# Patient Record
Sex: FEMALE | Employment: OTHER | URBAN - METROPOLITAN AREA
[De-identification: names, ages, dates, MRNs, and addresses within clinical notes are randomized per-mention and may not be internally consistent; named-entity substitution may affect disease eponyms.]

---

## 2023-01-04 ENCOUNTER — ANESTHESIA EVENT (OUTPATIENT)
Dept: SURGERY | Facility: CLINIC | Age: 78
End: 2023-01-04
Payer: COMMERCIAL

## 2023-01-04 ASSESSMENT — COPD QUESTIONNAIRES
COPD: 1
CAT_SEVERITY: MODERATE

## 2023-01-04 ASSESSMENT — LIFESTYLE VARIABLES: TOBACCO_USE: 1

## 2023-01-05 ENCOUNTER — HOSPITAL ENCOUNTER (OUTPATIENT)
Facility: CLINIC | Age: 78
Discharge: HOME | End: 2023-01-05
Attending: STUDENT IN AN ORGANIZED HEALTH CARE EDUCATION/TRAINING PROGRAM | Admitting: STUDENT IN AN ORGANIZED HEALTH CARE EDUCATION/TRAINING PROGRAM
Payer: COMMERCIAL

## 2023-01-05 ENCOUNTER — ANESTHESIA (OUTPATIENT)
Dept: SURGERY | Facility: CLINIC | Age: 78
End: 2023-01-05
Payer: COMMERCIAL

## 2023-01-05 VITALS
TEMPERATURE: 98 F | OXYGEN SATURATION: 90 % | SYSTOLIC BLOOD PRESSURE: 119 MMHG | HEART RATE: 62 BPM | DIASTOLIC BLOOD PRESSURE: 65 MMHG | RESPIRATION RATE: 16 BRPM

## 2023-01-05 PROCEDURE — 250N000009 HC RX 250: Performed by: NURSE ANESTHETIST, CERTIFIED REGISTERED

## 2023-01-05 PROCEDURE — 761N000008 HC RECOVERY CATRACT PACKAGE: Performed by: STUDENT IN AN ORGANIZED HEALTH CARE EDUCATION/TRAINING PROGRAM

## 2023-01-05 PROCEDURE — 250N000011 HC RX IP 250 OP 636: Performed by: STUDENT IN AN ORGANIZED HEALTH CARE EDUCATION/TRAINING PROGRAM

## 2023-01-05 PROCEDURE — 360N000007 HC CATARACT SURGICAL PACKAGE: Performed by: STUDENT IN AN ORGANIZED HEALTH CARE EDUCATION/TRAINING PROGRAM

## 2023-01-05 PROCEDURE — V2632 POST CHMBR INTRAOCULAR LENS: HCPCS | Performed by: STUDENT IN AN ORGANIZED HEALTH CARE EDUCATION/TRAINING PROGRAM

## 2023-01-05 PROCEDURE — 250N000011 HC RX IP 250 OP 636: Performed by: NURSE ANESTHETIST, CERTIFIED REGISTERED

## 2023-01-05 PROCEDURE — 370N000004 HC ANESTHESIA CATARACT PACKAGE: Performed by: STUDENT IN AN ORGANIZED HEALTH CARE EDUCATION/TRAINING PROGRAM

## 2023-01-05 PROCEDURE — 250N000009 HC RX 250: Performed by: STUDENT IN AN ORGANIZED HEALTH CARE EDUCATION/TRAINING PROGRAM

## 2023-01-05 DEVICE — EYE IMP IOL AMO PCL TECNIS ZCB00 22.5: Type: IMPLANTABLE DEVICE | Site: EYE | Status: FUNCTIONAL

## 2023-01-05 RX ORDER — LIDOCAINE 40 MG/G
CREAM TOPICAL
Status: DISCONTINUED | OUTPATIENT
Start: 2023-01-05 | End: 2023-01-05 | Stop reason: HOSPADM

## 2023-01-05 RX ORDER — DICLOFENAC SODIUM 1 MG/ML
1 SOLUTION/ DROPS OPHTHALMIC
Status: COMPLETED | OUTPATIENT
Start: 2023-01-05 | End: 2023-01-05

## 2023-01-05 RX ORDER — MOXIFLOXACIN 5 MG/ML
1 SOLUTION/ DROPS OPHTHALMIC
Status: COMPLETED | OUTPATIENT
Start: 2023-01-05 | End: 2023-01-05

## 2023-01-05 RX ORDER — PREDNISOLONE ACETATE 1 %
SUSPENSION, DROPS(FINAL DOSAGE FORM)(ML) OPHTHALMIC (EYE) PRN
Status: DISCONTINUED | OUTPATIENT
Start: 2023-01-05 | End: 2023-01-05 | Stop reason: HOSPADM

## 2023-01-05 RX ORDER — FENTANYL CITRATE 50 UG/ML
INJECTION, SOLUTION INTRAMUSCULAR; INTRAVENOUS PRN
Status: DISCONTINUED | OUTPATIENT
Start: 2023-01-05 | End: 2023-01-05

## 2023-01-05 RX ORDER — PROPARACAINE HYDROCHLORIDE 5 MG/ML
1 SOLUTION/ DROPS OPHTHALMIC ONCE
Status: DISCONTINUED | OUTPATIENT
Start: 2023-01-05 | End: 2023-01-05 | Stop reason: HOSPADM

## 2023-01-05 RX ORDER — PHENYLEPHRINE HYDROCHLORIDE 25 MG/ML
1 SOLUTION/ DROPS OPHTHALMIC
Status: COMPLETED | OUTPATIENT
Start: 2023-01-05 | End: 2023-01-05

## 2023-01-05 RX ORDER — PROPARACAINE HYDROCHLORIDE 5 MG/ML
1 SOLUTION/ DROPS OPHTHALMIC ONCE
Status: COMPLETED | OUTPATIENT
Start: 2023-01-05 | End: 2023-01-05

## 2023-01-05 RX ORDER — CYCLOPENTOLATE HYDROCHLORIDE 10 MG/ML
1 SOLUTION/ DROPS OPHTHALMIC
Status: COMPLETED | OUTPATIENT
Start: 2023-01-05 | End: 2023-01-05

## 2023-01-05 RX ADMIN — PROPARACAINE HYDROCHLORIDE 1 DROP: 5 SOLUTION/ DROPS OPHTHALMIC at 11:09

## 2023-01-05 RX ADMIN — MIDAZOLAM 1 MG: 1 INJECTION INTRAMUSCULAR; INTRAVENOUS at 11:51

## 2023-01-05 RX ADMIN — PHENYLEPHRINE HYDROCHLORIDE 1 DROP: 25 SOLUTION/ DROPS OPHTHALMIC at 11:23

## 2023-01-05 RX ADMIN — PHENYLEPHRINE HYDROCHLORIDE 1 DROP: 25 SOLUTION/ DROPS OPHTHALMIC at 11:14

## 2023-01-05 RX ADMIN — DICLOFENAC SODIUM 1 DROP: 1 SOLUTION OPHTHALMIC at 11:21

## 2023-01-05 RX ADMIN — CYCLOPENTOLATE HYDROCHLORIDE 1 DROP: 10 SOLUTION/ DROPS OPHTHALMIC at 11:18

## 2023-01-05 RX ADMIN — DICLOFENAC SODIUM 1 DROP: 1 SOLUTION OPHTHALMIC at 11:11

## 2023-01-05 RX ADMIN — CYCLOPENTOLATE HYDROCHLORIDE 1 DROP: 10 SOLUTION/ DROPS OPHTHALMIC at 11:12

## 2023-01-05 RX ADMIN — CYCLOPENTOLATE HYDROCHLORIDE 1 DROP: 10 SOLUTION/ DROPS OPHTHALMIC at 11:22

## 2023-01-05 RX ADMIN — MOXIFLOXACIN OPHTHALMIC SOLUTION 1 DROP: 5 SOLUTION/ DROPS OPHTHALMIC at 11:23

## 2023-01-05 RX ADMIN — MOXIFLOXACIN OPHTHALMIC SOLUTION 1 DROP: 5 SOLUTION/ DROPS OPHTHALMIC at 11:19

## 2023-01-05 RX ADMIN — FENTANYL CITRATE 50 MCG: 50 INJECTION, SOLUTION INTRAMUSCULAR; INTRAVENOUS at 11:47

## 2023-01-05 RX ADMIN — MIDAZOLAM 1 MG: 1 INJECTION INTRAMUSCULAR; INTRAVENOUS at 11:47

## 2023-01-05 RX ADMIN — DICLOFENAC SODIUM 1 DROP: 1 SOLUTION OPHTHALMIC at 11:16

## 2023-01-05 RX ADMIN — PHENYLEPHRINE HYDROCHLORIDE 1 DROP: 25 SOLUTION/ DROPS OPHTHALMIC at 11:20

## 2023-01-05 RX ADMIN — LIDOCAINE HYDROCHLORIDE 0.5 ML: 10 INJECTION, SOLUTION EPIDURAL; INFILTRATION; INTRACAUDAL; PERINEURAL at 11:12

## 2023-01-05 RX ADMIN — MOXIFLOXACIN OPHTHALMIC SOLUTION 1 DROP: 5 SOLUTION/ DROPS OPHTHALMIC at 11:13

## 2023-01-05 ASSESSMENT — ACTIVITIES OF DAILY LIVING (ADL)
ADLS_ACUITY_SCORE: 35
ADLS_ACUITY_SCORE: 33

## 2023-01-05 NOTE — ANESTHESIA PREPROCEDURE EVALUATION
Anesthesia Pre-Procedure Evaluation    Patient: Bianca Holden   MRN: [id number] : 1945        Procedure : Procedure(s):  PHACOEMULSIFICATION, CATARACT, WITH STANDARD INTRAOCULAR LENS IMPLANT INSERTION          Past Medical History:   Diagnosis Date     Abnormal Papanicolaou smear of cervix and cervical HPV     s/p LOOP excision, normal PAPs since     Other nonspecific findings on examination of blood(790.99) 01    FSH 50.0  Normal range 4-13 , Postmenopausal      Pleurisy without mention of effusion or current tuberculosis     History of pleurisy     Tobacco use disorder      Unspecified , without mention of complication, unspecified     at 6 weeks gestation, pregnancy #2 ()     Unspecified menopausal and postmenopausal disorder     michelle-menopausal      Past Surgical History:   Procedure Laterality Date     C MAMMOGRAM, SCREENING  ,     normal     COLPOSCOPY,LOOP ELECTRD CERVIX EXCIS  10/02/1996    Pap smear revealed high grade squamous intraepithelial lesion; colposcopy revealed diffuse changes consistent with GENE I-II     HCL PAP SMEAR  ,      [facility] LIGATE FALLOPIAN TUBE,POSTPARTUM      Tubal Ligation      Allergies   Allergen Reactions     Sulfa Drugs      Bactrim - pain in arms and legs      Social History     Tobacco Use     Smoking status: Every Day     Packs/day: 1.00     Years: 37.00     Pack years: 37.00     Types: Cigarettes     Smokeless tobacco: Not on file   Substance Use Topics     Alcohol use: Yes     Alcohol/week: 6.7 standard drinks     Comment: 4 beers on weekends if she goes out      Wt Readings from Last 1 Encounters:   02 60.8 kg (134 lb)        Anesthesia Evaluation   Pt has had prior anesthetic. Type: MAC and General.    No history of anesthetic complications       ROS/MED HX  ENT/Pulmonary: Comment: H/O pleurisy     (+) tobacco use, Current use, moderate,  COPD,     Neurologic:  - neg neurologic ROS     Cardiovascular:     (+)  Dyslipidemia hypertension-----No previous cardiac testing     METS/Exercise Tolerance:     Hematologic:  - neg hematologic  ROS     Musculoskeletal:       GI/Hepatic:     (+) GERD, Asymptomatic on medication,     Renal/Genitourinary:  - neg Renal ROS     Endo:  - neg endo ROS     Psychiatric/Substance Use: Comment: Stopped drinking in 2000    (+) psychiatric history anxiety and depression alcohol abuse     Infectious Disease:  - neg infectious disease ROS     Malignancy:  - neg malignancy ROS     Other:  - neg other ROS    (+) , H/O Chronic Pain,        Physical Exam    Airway  airway exam normal      Mallampati: II   TM distance: > 3 FB   Neck ROM: full   Mouth opening: > 3 cm    Respiratory Devices and Support         Dental       (+) caps      Cardiovascular   cardiovascular exam normal       Rhythm and rate: regular and normal     Pulmonary   pulmonary exam normal        breath sounds clear to auscultation           OUTSIDE LABS:  CBC:   Lab Results   Component Value Date    WBC 3.6 (L) 05/01/2001    HGB 15.3 05/01/2001    HCT 43.6 05/01/2001     05/01/2001     BMP:   Lab Results   Component Value Date     (H) 05/01/2001    POTASSIUM 4.5 05/01/2001    CHLORIDE 112 (H) 05/01/2001    CO2 28 05/01/2001    BUN 8 05/01/2001    CR 0.6 05/01/2001    GLC 85 05/01/2001     COAGS: No results found for: PTT, INR, FIBR  POC: No results found for: BGM, HCG, HCGS  HEPATIC:   Lab Results   Component Value Date    ALBUMIN 4.5 05/01/2001    PROTTOTAL 7.9 05/01/2001    ALT 93 (H) 05/01/2001     (H) 05/01/2001    ALKPHOS 118 05/01/2001    BILITOTAL 0.6 05/01/2001     OTHER:   Lab Results   Component Value Date    VITALIY 9.1 05/01/2001    TSH 1.38 05/01/2001    SED 23 05/01/2001       Anesthesia Plan    ASA Status:  3   NPO Status:  NPO Appropriate    Anesthesia Type: MAC.     - Reason for MAC: straight local not clinically adequate      Maintenance: TIVA.        Consents    Anesthesia Plan(s) and associated risks,  benefits, and realistic alternatives discussed. Questions answered and patient/representative(s) expressed understanding.    - Discussed:     - Discussed with:  Patient    Use of blood products discussed: No .     Postoperative Care            Comments:    Other Comments: The risks and benefits of anesthesia, and the alternatives where applicable, have been discussed with the patient, and they wish to proceed.       H&P reviewed: Unable to attach H&P to encounter due to EHR limitations. H&P Update: appropriate H&P reviewed, patient examined. No interval changes since H&P (within 30 days).         CAMPOS Ontiveros CRNA

## 2023-01-05 NOTE — ANESTHESIA CARE TRANSFER NOTE
Patient: Bianca Holden    Procedure: Procedure(s):  PHACOEMULSIFICATION, CATARACT, WITH STANDARD INTRAOCULAR LENS IMPLANT INSERTION right       Diagnosis: Cataract [H26.9]  Diagnosis Additional Information: No value filed.    Anesthesia Type:   MAC     Note:    Oropharynx: spontaneously breathing  Level of Consciousness: awake  Oxygen Supplementation: room air    Independent Airway: airway patency satisfactory and stable  Dentition: dentition unchanged  Vital Signs Stable: post-procedure vital signs reviewed and stable  Report to RN Given: handoff report given  Patient transferred to: Phase II    Handoff Report: Identifed the Patient, Identified the Reponsible Provider, Reviewed the pertinent medical history, Discussed the surgical course, Reviewed Intra-OP anesthesia mangement and issues during anesthesia, Set expectations for post-procedure period and Allowed opportunity for questions and acknowledgement of understanding      Vitals:  Vitals Value Taken Time   BP     Temp     Pulse     Resp     SpO2         Electronically Signed By: CAMPOS Ko CRNA  January 5, 2023  12:21 PM

## 2023-01-05 NOTE — OP NOTE
[facility]  Ophthalmology Operative Note    PREOPERATIVE DIAGNOSIS: Cataract, Right eye.     POSTOPERATIVE DIAGNOSIS: Cataract, Right eye.     OPERATION: Cataract extraction with placement of posterior chamber intraocular lens in the Right eye.     ANESTHESIA: MAC combined with topical     INDICATIONS FOR PROCEDURE: Bianca Holden was seen in the [facility] for decreased visual acuity in the Right eye. The patient was found to have a visually significant cataract in the Right eye. The risks, benefits, alternatives and goals of cataract extraction were discussed with the patient, and after adequate discussion the patient understood and agreed to these, and a signed informed consent was obtained prior to the procedure.     DESCRIPTION OF PROCEDURE: After proper patient identification, topical anesthesia was applied to the Right eye. The patient was brought to the operating room and the Right eye was prepped and draped in the usual sterile fashion for intraocular surgery. A lid speculum was placed in the Right eye. A paracentesis was then created and the anterior chamber was filled with 1% non-preserved lidocaine followed by Endocoat. A clear corneal incision was then created temporally using a 2.4mm keratome. A continuous curvilinear capsulorrhexis was then created using a cystotome and Utrata forceps. Hydrodissection was carried out with BSS on a Erazo cannula and the lens rotated freely within the capsular bag. Phacoemulsification was then carried out using the divide and conquer technique. Residual cortical material was removed using the I&A handpiece. The capsular bag was then filled with Provisc and a ZCB00 +22.5 diopter intraocular lens was then injected into the capsular bag. The lens showed good centration and stability. Residual viscoelastic was removed using the I&A handpiece. The wound was then hydrated and the anterior chamber reformed. Intracameral  Moxifloxacin was then injected into the anterior chamber. The wounds were then checked and found to be sealed. Topical Prednisolone drops were placed in the patient's Right eye followed by a De La Rosa shield over the top of this. The patient tolerated the procedure well without complications and was told to follow up in the clinic in the next postoperative day.     Implant Name Type Inv. Item Serial No.  Lot No. LRB No. Used Action   EYE IMP IOL EDGARDO PCL TECNIS ZCB00 22.5 - [id number] Lens/Eye Implant EYE IMP IOL EDGARDO PCL TECNIS ZCB00 22.5 [id number] [employer]  Right 1 Implanted        Mike Dias MD

## 2023-01-05 NOTE — DISCHARGE INSTRUCTIONS
POST CATARACT SURGERY EYE INSTRUCTIONS             Eye Medications    VIGAMOX/OFLOXACIN (Tan Cap)    KETOROLAC (Muhammad Cap)    PREDNISOLONE (Pink or White Cap)    If you opted for the individual bottles of eye medications follow these instructions.    *Instill one drop from each bottle into the operative eye 3 times a day until each  bottle is empty.    *Wait 5 minutes between each drop.    If you opted for the one bottle containing all 3 eye medications, follow these instructions.    *Instill one drop into the operative eye 3 times a day until the bottle is empty.      If your are currently being treated for glaucoma please continue your medication as normally prescribed.      Wear eye shield while sleeping for 3 days    Refrain from heavy lifting, bending, straining, or strenuous activity for 1 week.    Do not rub or push on the operative eye for 1 week (you may wipe the eye lid(s) gently with a wet wash cloth to remove matter from eyelashes).    You may bathe or shower, but do not get the eye wet for 1 month (swimming, hot tubs or other water activities).    Minor itching, scratching sensation, burning sensation, etc. is normal.  Report any severe eye pain or loss of vision.      Please call our office at [phone] if you have questions or concerns.

## 2023-01-05 NOTE — BRIEF OP NOTE
[facility]    Brief Operative Note    Pre-operative diagnosis: Cataract, Right Eye  Post-operative diagnosis Same as pre-operative diagnosis    Procedure: Procedure(s):  PHACOEMULSIFICATION, CATARACT, WITH STANDARD INTRAOCULAR LENS IMPLANT INSERTION right  Surgeon: Surgeon(s) and Role:     * Mike Dias MD - Primary  Anesthesia: MAC with Topical   Estimated Blood Loss: 0 mL from 1/5/2023 11:51 AM to 1/5/2023 12:18 PM      Drains: None  Specimens: * No specimens in log *  Findings:   None.  Complications: None.  Implants:   Implant Name Type Inv. Item Serial No.  Lot No. LRB No. Used Action   EYE IMP IOL EDGARDO PCL TECNIS ZCB00 22.5 - [id number] Lens/Eye Implant EYE IMP IOL EDGARDO PCL TECNIS ZCB00 22.5 [id number] [employer]  Right 1 Implanted

## 2023-01-05 NOTE — ANESTHESIA POSTPROCEDURE EVALUATION
Patient: Bianca Holden    Procedure: Procedure(s):  PHACOEMULSIFICATION, CATARACT, WITH STANDARD INTRAOCULAR LENS IMPLANT INSERTION right       Anesthesia Type:  MAC    Note:  Disposition: Outpatient   Postop Pain Control: Uneventful            Sign Out: Well controlled pain   PONV: No   Neuro/Psych: Uneventful            Sign Out: Acceptable/Baseline neuro status   Airway/Respiratory: Uneventful            Sign Out: Acceptable/Baseline resp. status   CV/Hemodynamics: Uneventful            Sign Out: Acceptable CV status   Other NRE: NONE   DID A NON-ROUTINE EVENT OCCUR? No    Event details/Postop Comments:  Pt was happy with anesthesia care.  No complications.  I will follow up with the pt if needed.           Last vitals:  Vitals:    01/05/23 1110   BP: 134/76   Pulse: 63   Resp: 16   Temp: 98  F (36.7  C)   SpO2: 94%       Electronically Signed By: CAMPOS Ko CRNA  January 5, 2023  12:21 PM

## 2023-01-18 ENCOUNTER — ANESTHESIA EVENT (OUTPATIENT)
Dept: SURGERY | Facility: CLINIC | Age: 78
End: 2023-01-18
Payer: COMMERCIAL

## 2023-01-18 ASSESSMENT — COPD QUESTIONNAIRES
COPD: 1
CAT_SEVERITY: MODERATE

## 2023-01-18 ASSESSMENT — LIFESTYLE VARIABLES: TOBACCO_USE: 1

## 2023-01-19 ENCOUNTER — HOSPITAL ENCOUNTER (OUTPATIENT)
Facility: CLINIC | Age: 78
Discharge: HOME | End: 2023-01-19
Attending: OPHTHALMOLOGY | Admitting: OPHTHALMOLOGY
Payer: COMMERCIAL

## 2023-01-19 ENCOUNTER — ANESTHESIA (OUTPATIENT)
Dept: SURGERY | Facility: CLINIC | Age: 78
End: 2023-01-19
Payer: COMMERCIAL

## 2023-01-19 VITALS
HEART RATE: 62 BPM | SYSTOLIC BLOOD PRESSURE: 104 MMHG | OXYGEN SATURATION: 90 % | RESPIRATION RATE: 18 BRPM | TEMPERATURE: 98.4 F | DIASTOLIC BLOOD PRESSURE: 58 MMHG

## 2023-01-19 PROCEDURE — 360N000007 HC CATARACT SURGICAL PACKAGE: Performed by: OPHTHALMOLOGY

## 2023-01-19 PROCEDURE — 250N000009 HC RX 250: Performed by: OPHTHALMOLOGY

## 2023-01-19 PROCEDURE — 250N000009 HC RX 250: Performed by: NURSE ANESTHETIST, CERTIFIED REGISTERED

## 2023-01-19 PROCEDURE — 250N000011 HC RX IP 250 OP 636: Performed by: NURSE ANESTHETIST, CERTIFIED REGISTERED

## 2023-01-19 PROCEDURE — 761N000008 HC RECOVERY CATRACT PACKAGE: Performed by: OPHTHALMOLOGY

## 2023-01-19 PROCEDURE — V2632 POST CHMBR INTRAOCULAR LENS: HCPCS | Performed by: OPHTHALMOLOGY

## 2023-01-19 PROCEDURE — 370N000004 HC ANESTHESIA CATARACT PACKAGE: Performed by: OPHTHALMOLOGY

## 2023-01-19 PROCEDURE — 250N000011 HC RX IP 250 OP 636: Performed by: OPHTHALMOLOGY

## 2023-01-19 DEVICE — EYE IMP IOL AMO PCL TECNIS ZCB00 22.0: Type: IMPLANTABLE DEVICE | Site: EYE | Status: FUNCTIONAL

## 2023-01-19 RX ORDER — FENTANYL CITRATE 50 UG/ML
INJECTION, SOLUTION INTRAMUSCULAR; INTRAVENOUS PRN
Status: DISCONTINUED | OUTPATIENT
Start: 2023-01-19 | End: 2023-01-19

## 2023-01-19 RX ORDER — PROPARACAINE HYDROCHLORIDE 5 MG/ML
1 SOLUTION/ DROPS OPHTHALMIC ONCE
Status: COMPLETED | OUTPATIENT
Start: 2023-01-19 | End: 2023-01-19

## 2023-01-19 RX ORDER — MOXIFLOXACIN 5 MG/ML
1 SOLUTION/ DROPS OPHTHALMIC
Status: COMPLETED | OUTPATIENT
Start: 2023-01-19 | End: 2023-01-19

## 2023-01-19 RX ORDER — PHENYLEPHRINE HYDROCHLORIDE 25 MG/ML
1 SOLUTION/ DROPS OPHTHALMIC
Status: COMPLETED | OUTPATIENT
Start: 2023-01-19 | End: 2023-01-19

## 2023-01-19 RX ORDER — TROPICAMIDE 10 MG/ML
1 SOLUTION/ DROPS OPHTHALMIC
Status: COMPLETED | OUTPATIENT
Start: 2023-01-19 | End: 2023-01-19

## 2023-01-19 RX ORDER — PREDNISOLONE/MOXIFLO/NEPAFENAC 1-0.5-0.1%
SUSPENSION, DROPS(FINAL DOSAGE FORM)(ML) OPHTHALMIC (EYE) PRN
Status: DISCONTINUED | OUTPATIENT
Start: 2023-01-19 | End: 2023-01-19 | Stop reason: HOSPADM

## 2023-01-19 RX ORDER — PROPARACAINE HYDROCHLORIDE 5 MG/ML
1 SOLUTION/ DROPS OPHTHALMIC ONCE
Status: DISCONTINUED | OUTPATIENT
Start: 2023-01-19 | End: 2023-01-19 | Stop reason: HOSPADM

## 2023-01-19 RX ORDER — FENTANYL CITRATE 50 UG/ML
25 INJECTION, SOLUTION INTRAMUSCULAR; INTRAVENOUS
Status: DISCONTINUED | OUTPATIENT
Start: 2023-01-19 | End: 2023-01-19 | Stop reason: HOSPADM

## 2023-01-19 RX ORDER — SODIUM CHLORIDE, SODIUM LACTATE, POTASSIUM CHLORIDE, CALCIUM CHLORIDE 600; 310; 30; 20 MG/100ML; MG/100ML; MG/100ML; MG/100ML
INJECTION, SOLUTION INTRAVENOUS CONTINUOUS
Status: CANCELLED | OUTPATIENT
Start: 2023-01-19

## 2023-01-19 RX ADMIN — LIDOCAINE HYDROCHLORIDE 0.1 ML: 10 INJECTION, SOLUTION EPIDURAL; INFILTRATION; INTRACAUDAL; PERINEURAL at 11:15

## 2023-01-19 RX ADMIN — PHENYLEPHRINE HYDROCHLORIDE 1 DROP: 25 SOLUTION/ DROPS OPHTHALMIC at 11:10

## 2023-01-19 RX ADMIN — MIDAZOLAM 1 MG: 1 INJECTION INTRAMUSCULAR; INTRAVENOUS at 12:14

## 2023-01-19 RX ADMIN — FENTANYL CITRATE 50 MCG: 50 INJECTION, SOLUTION INTRAMUSCULAR; INTRAVENOUS at 12:14

## 2023-01-19 RX ADMIN — MOXIFLOXACIN 1 DROP: 5 SOLUTION/ DROPS OPHTHALMIC at 11:17

## 2023-01-19 RX ADMIN — TROPICAMIDE 1 DROP: 10 SOLUTION/ DROPS OPHTHALMIC at 11:17

## 2023-01-19 RX ADMIN — MOXIFLOXACIN 1 DROP: 5 SOLUTION/ DROPS OPHTHALMIC at 11:04

## 2023-01-19 RX ADMIN — PHENYLEPHRINE HYDROCHLORIDE 1 DROP: 25 SOLUTION/ DROPS OPHTHALMIC at 11:04

## 2023-01-19 RX ADMIN — PHENYLEPHRINE HYDROCHLORIDE 1 DROP: 25 SOLUTION/ DROPS OPHTHALMIC at 11:17

## 2023-01-19 RX ADMIN — PROPARACAINE HYDROCHLORIDE 1 DROP: 5 SOLUTION/ DROPS OPHTHALMIC at 11:03

## 2023-01-19 RX ADMIN — TROPICAMIDE 1 DROP: 10 SOLUTION/ DROPS OPHTHALMIC at 11:10

## 2023-01-19 RX ADMIN — TROPICAMIDE 1 DROP: 10 SOLUTION/ DROPS OPHTHALMIC at 11:04

## 2023-01-19 RX ADMIN — MOXIFLOXACIN 1 DROP: 5 SOLUTION/ DROPS OPHTHALMIC at 11:10

## 2023-01-19 ASSESSMENT — ACTIVITIES OF DAILY LIVING (ADL)
ADLS_ACUITY_SCORE: 35
ADLS_ACUITY_SCORE: 35

## 2023-01-19 NOTE — DISCHARGE INSTRUCTIONS
POST CATARACT SURGERY EYE INSTRUCTIONS             Eye Medications    VIGAMOX/OFLOXACIN (Tan Cap)    KETOROLAC (Muhammad Cap)    PREDNISOLONE (Pink or White Cap)    If you opted for the individual bottles of eye medications follow these instructions.    *Instill one drop from each bottle into the operative eye 3 times a day until each  bottle is empty.    *Wait 5 minutes between each drop.    If you opted for the one bottle containing all 3 eye medications, follow these instructions.    *Instill one drop into the operative eye 3 times a day until the bottle is empty.      If your are currently being treated for glaucoma please continue your medication as normally prescribed.      Wear eye shield while sleeping for 3 days    Refrain from heavy lifting, bending, straining, or strenuous activity for 1 week.    Do not rub or push on the operative eye for 1 week (you may wipe the eye lid(s) gently with a wet wash cloth to remove matter from eyelashes).    You may bathe or shower, but do not get the eye wet for 1 month (swimming, hot tubs or other water activities).    Minor itching, scratching sensation, burning sensation, etc. is normal.  Report any severe eye pain or loss of vision.      Please call our office at [phone] if you have questions or concerns.

## 2023-01-19 NOTE — ANESTHESIA PREPROCEDURE EVALUATION
Anesthesia Pre-Procedure Evaluation    Patient: Bianca Holden   MRN: [id number] : 1945        Procedure : Procedure(s):  PHACOEMULSIFICATION, CATARACT, WITH STANDARD INTRAOCULAR LENS IMPLANT INSERTION          Past Medical History:   Diagnosis Date     Abnormal Papanicolaou smear of cervix and cervical HPV     s/p LOOP excision, normal PAPs since     Other nonspecific findings on examination of blood(790.99) 01    FSH 50.0  Normal range 4-13 , Postmenopausal      Pleurisy without mention of effusion or current tuberculosis     History of pleurisy     Tobacco use disorder      Unspecified , without mention of complication, unspecified     at 6 weeks gestation, pregnancy #2 ()     Unspecified menopausal and postmenopausal disorder     michelle-menopausal      Past Surgical History:   Procedure Laterality Date     C MAMMOGRAM, SCREENING  ,     normal     COLPOSCOPY,LOOP ELECTRD CERVIX EXCIS  10/02/1996    Pap smear revealed high grade squamous intraepithelial lesion; colposcopy revealed diffuse changes consistent with GENE I-II     HCL PAP SMEAR  ,      PHACOEMULSIFICATION WITH STANDARD INTRAOCULAR LENS IMPLANT Right 2023    Procedure: PHACOEMULSIFICATION, CATARACT, WITH STANDARD INTRAOCULAR LENS IMPLANT INSERTION right;  Surgeon: Mike Dias MD;  Location:  OR     [facility] LIGATE FALLOPIAN TUBE,POSTPARTUM      Tubal Ligation      Allergies   Allergen Reactions     Sulfa Drugs      Bactrim - pain in arms and legs      Social History     Tobacco Use     Smoking status: Every Day     Packs/day: 1.00     Years: 37.00     Pack years: 37.00     Types: Cigarettes     Smokeless tobacco: Not on file   Substance Use Topics     Alcohol use: Yes     Alcohol/week: 6.7 standard drinks     Comment: 4 beers on weekends if she goes out      Wt Readings from Last 1 Encounters:   02 60.8 kg (134 lb)        Anesthesia Evaluation   Pt has had prior anesthetic. Type: MAC  and General.    No history of anesthetic complications       ROS/MED HX  ENT/Pulmonary: Comment: H/O pleurisy     (+) tobacco use, Current use, moderate,  COPD,     Neurologic:  - neg neurologic ROS     Cardiovascular:     (+) Dyslipidemia hypertension-----No previous cardiac testing     METS/Exercise Tolerance:     Hematologic:  - neg hematologic  ROS     Musculoskeletal:       GI/Hepatic:     (+) GERD, Asymptomatic on medication,     Renal/Genitourinary:  - neg Renal ROS     Endo:  - neg endo ROS     Psychiatric/Substance Use: Comment: Stopped drinking in 2000    (+) psychiatric history anxiety and depression alcohol abuse     Infectious Disease:  - neg infectious disease ROS     Malignancy:  - neg malignancy ROS     Other:  - neg other ROS    (+) , H/O Chronic Pain,        Physical Exam    Airway  airway exam normal      Mallampati: II   TM distance: > 3 FB   Neck ROM: full   Mouth opening: > 3 cm    Respiratory Devices and Support         Dental       (+) caps      Cardiovascular   cardiovascular exam normal       Rhythm and rate: regular and normal     Pulmonary   pulmonary exam normal        breath sounds clear to auscultation           OUTSIDE LABS:  CBC:   Lab Results   Component Value Date    WBC 3.6 (L) 05/01/2001    HGB 15.3 05/01/2001    HCT 43.6 05/01/2001     05/01/2001     BMP:   Lab Results   Component Value Date     (H) 05/01/2001    POTASSIUM 4.5 05/01/2001    CHLORIDE 112 (H) 05/01/2001    CO2 28 05/01/2001    BUN 8 05/01/2001    CR 0.6 05/01/2001    GLC 85 05/01/2001     COAGS: No results found for: PTT, INR, FIBR  POC: No results found for: BGM, HCG, HCGS  HEPATIC:   Lab Results   Component Value Date    ALBUMIN 4.5 05/01/2001    PROTTOTAL 7.9 05/01/2001    ALT 93 (H) 05/01/2001     (H) 05/01/2001    ALKPHOS 118 05/01/2001    BILITOTAL 0.6 05/01/2001     OTHER:   Lab Results   Component Value Date    VITALIY 9.1 05/01/2001    TSH 1.38 05/01/2001    SED 23 05/01/2001        Anesthesia Plan    ASA Status:  3   NPO Status:  NPO Appropriate    Anesthesia Type: MAC.     - Reason for MAC: straight local not clinically adequate      Maintenance: TIVA.        Consents    Anesthesia Plan(s) and associated risks, benefits, and realistic alternatives discussed. Questions answered and patient/representative(s) expressed understanding.    - Discussed:     - Discussed with:  Patient    Use of blood products discussed: No .     Postoperative Care            Comments:    Other Comments: The risks and benefits of anesthesia, and the alternatives where applicable, have been discussed with the patient, and they wish to proceed.       H&P reviewed: Unable to attach H&P to encounter due to EHR limitations. H&P Update: appropriate H&P reviewed, patient examined. No interval changes since H&P (within 30 days).         CAMPOS Ko CRNA   78

## 2023-01-19 NOTE — ANESTHESIA CARE TRANSFER NOTE
Patient: Bianca Holden    Procedure: Procedure(s):  PHACOEMULSIFICATION, CATARACT, WITH STANDARD INTRAOCULAR LENS IMPLANT INSERTION       Diagnosis: Cataract [H26.9]  Diagnosis Additional Information: No value filed.    Anesthesia Type:   MAC     Note:    Oropharynx: spontaneously breathing  Level of Consciousness: awake  Oxygen Supplementation: room air    Independent Airway: airway patency satisfactory and stable  Dentition: dentition unchanged  Vital Signs Stable: post-procedure vital signs reviewed and stable  Report to RN Given: handoff report given  Patient transferred to: Phase II    Handoff Report: Identifed the Patient, Identified the Reponsible Provider, Reviewed the pertinent medical history, Discussed the surgical course, Reviewed Intra-OP anesthesia mangement and issues during anesthesia, Set expectations for post-procedure period and Allowed opportunity for questions and acknowledgement of understanding      Vitals:  Vitals Value Taken Time   /58 01/19/23 1250   Temp     Pulse 62 01/19/23 1250   Resp     SpO2 88 % 01/19/23 1255   Vitals shown include unvalidated device data.    Electronically Signed By: CAMPOS Ko CRNA  January 19, 2023  12:56 PM

## 2023-01-19 NOTE — OP NOTE
[facility]  Ophthalmology Operative Note    PREOPERATIVE DIAGNOSIS: Cataract, Left eye.   POSTOPERATIVE DIAGNOSIS: Cataract, Left eye.   PROCEDURE: Kelman phacoemulsification with posterior chamber lens implant, Left eye.   ANESTHESIA: Topical.   COMPLICATIONS: None.   INDICATIONS FOR PROCEDURE: Bianca Holden is a 77 year old female who was evaluated preoperatively in the eye clinic and found to have a visually significant cataract of the Left eye which decreased her vision to the 20/40 level. This decreased vision was interfering with activities of daily living such as reading and driving. The patient was deemed a suitable candidate for cataract extraction. The risks, benefits and alternatives were explained to the patient. All questions were answered and the patient elected to proceed with cataract extraction and lens implant.   DESCRIPTION OF PROCEDURE: After informed consent was obtained, the patient was given topical lidocaine gel to the Left eye and it was prepped with Betadine and draped in the usual sterile manner. A paracentesis was made at the 12 o'clock position and the anterior chamber was inflated with Endocoat. A clear corneal incision using a 2.5 mm metal keratome was made at the 2 o'clock position. A continuous tear anterior capsulorrhexis was started with the cystotome and completed with the Utrata forceps. The nucleus was hydrodissected and found to rotate freely. The nucleus was removed with the phacoemulsification handpiece and the residual cortex with the irrigation-aspiration handpiece. The capsular bag was inspected and found to be free of any tears or breaks and was inflated with Healon. A model ZCBOO 22.0 diopter posterior chamber lens was inserted into the capsular bag without complications. It was found to center well and rotate freely. The residual Healon was removed with the irrigation-aspiration handpiece. The anterior chamber was inflated with balanced salt solution.  The eye was palpated and found to be of normal physiologic pressure. The wound was inspected and found to be free of any leaks. Intracamaeral moxifloxicin was injected into the anterior chamber.  One drop each of Betadine, Vigamox, and Omnipred were instilled in the Left eye and a shield was placed over the eye. The patient was transferred to the postanesthesia care unit in stable condition.     Ramon Crystal M.D.

## 2023-01-19 NOTE — ANESTHESIA POSTPROCEDURE EVALUATION
Patient: Bianca Holden    Procedure: Procedure(s):  PHACOEMULSIFICATION, CATARACT, WITH STANDARD INTRAOCULAR LENS IMPLANT INSERTION       Anesthesia Type:  MAC    Note:  Disposition: Outpatient   Postop Pain Control: Uneventful            Sign Out: Well controlled pain   PONV: No   Neuro/Psych: Uneventful            Sign Out: Acceptable/Baseline neuro status   Airway/Respiratory: Uneventful            Sign Out: Acceptable/Baseline resp. status   CV/Hemodynamics: Uneventful            Sign Out: Acceptable CV status   Other NRE: NONE   DID A NON-ROUTINE EVENT OCCUR? No    Event details/Postop Comments:  Pt was happy with anesthesia care.  No complications.  I will follow up with the pt if needed.           Last vitals:  Vitals Value Taken Time   /58 01/19/23 1250   Temp     Pulse 62 01/19/23 1250   Resp     SpO2 91 % 01/19/23 1256   Vitals shown include unvalidated device data.    Electronically Signed By: CAMPOS Ko CRNA  January 19, 2023  12:57 PM

## (undated) DEVICE — SOL WATER IRRIG 1000ML BOTTLE 07139-09

## (undated) DEVICE — GLOVE PROTEGRITY 7.5 LATEX

## (undated) DEVICE — SOL NACL 0.9% IRRIG 1000ML BOTTLE 07138-09

## (undated) RX ORDER — FENTANYL CITRATE 50 UG/ML
INJECTION, SOLUTION INTRAMUSCULAR; INTRAVENOUS
Status: DISPENSED
Start: 2023-01-19

## (undated) RX ORDER — FENTANYL CITRATE 50 UG/ML
INJECTION, SOLUTION INTRAMUSCULAR; INTRAVENOUS
Status: DISPENSED
Start: 2023-01-05